# Patient Record
Sex: FEMALE | Race: WHITE | Employment: FULL TIME | URBAN - METROPOLITAN AREA
[De-identification: names, ages, dates, MRNs, and addresses within clinical notes are randomized per-mention and may not be internally consistent; named-entity substitution may affect disease eponyms.]

---

## 2017-09-12 ENCOUNTER — OFFICE VISIT (OUTPATIENT)
Dept: ORTHOPEDIC SURGERY | Age: 52
End: 2017-09-12

## 2017-09-12 VITALS
HEIGHT: 62 IN | DIASTOLIC BLOOD PRESSURE: 78 MMHG | SYSTOLIC BLOOD PRESSURE: 124 MMHG | WEIGHT: 115 LBS | BODY MASS INDEX: 21.16 KG/M2 | HEART RATE: 75 BPM

## 2017-09-12 DIAGNOSIS — S52.291A OTHER CLOSED FRACTURE OF SHAFT OF RIGHT ULNA, INITIAL ENCOUNTER: Primary | ICD-10-CM

## 2017-09-12 DIAGNOSIS — M79.631 RIGHT FOREARM PAIN: ICD-10-CM

## 2017-09-12 PROCEDURE — 99203 OFFICE O/P NEW LOW 30 MIN: CPT | Performed by: ORTHOPAEDIC SURGERY

## 2017-09-12 PROCEDURE — L3984 UPPER EXT FX ORTHOSIS WRIST: HCPCS | Performed by: ORTHOPAEDIC SURGERY

## 2017-09-12 RX ORDER — COVID-19 ANTIGEN TEST
KIT MISCELLANEOUS
COMMUNITY

## 2017-09-12 RX ORDER — ACETAMINOPHEN, ASPIRIN AND CAFFEINE 250; 250; 65 MG/1; MG/1; MG/1
TABLET, FILM COATED ORAL
COMMUNITY

## 2017-09-12 RX ORDER — EPINEPHRINE 0.3 MG/.3ML
INJECTION SUBCUTANEOUS
COMMUNITY
Start: 2016-03-04

## 2017-09-13 ENCOUNTER — TELEPHONE (OUTPATIENT)
Dept: ORTHOPEDIC SURGERY | Age: 52
End: 2017-09-13

## 2017-10-03 ENCOUNTER — OFFICE VISIT (OUTPATIENT)
Dept: ORTHOPEDIC SURGERY | Age: 52
End: 2017-10-03

## 2017-10-03 VITALS
HEART RATE: 71 BPM | WEIGHT: 115.08 LBS | HEIGHT: 62 IN | BODY MASS INDEX: 21.18 KG/M2 | DIASTOLIC BLOOD PRESSURE: 81 MMHG | SYSTOLIC BLOOD PRESSURE: 128 MMHG

## 2017-10-03 DIAGNOSIS — S52.291A OTHER CLOSED FRACTURE OF SHAFT OF RIGHT ULNA, INITIAL ENCOUNTER: Primary | ICD-10-CM

## 2017-10-03 DIAGNOSIS — M79.631 RIGHT FOREARM PAIN: ICD-10-CM

## 2017-10-03 PROCEDURE — 99212 OFFICE O/P EST SF 10 MIN: CPT | Performed by: ORTHOPAEDIC SURGERY

## 2017-10-03 NOTE — MR AVS SNAPSHOT
XR RADIUS ULNA RIGHT (2 VIEWS)          Result Summary for XR RADIUS ULNA RIGHT (2 VIEWS)      Result Information     Status          Final result (Exam End: 10/3/2017  4:27 PM)           10/3/2017  4:27 PM      Narrative & Impression           Radiology exam is complete. No Radiologist dictation. Please follow up with ordering provider. Additional Information        Basic Information     Date Of Birth Sex Race Ethnicity Preferred Language    1965 Female White / English      Problem List as of 10/3/2017  Date Reviewed: 10/3/2017          None      Preventive Care        Date Due    HIV screening is recommended for all people regardless of risk factors  aged 15-65 years at least once (lifetime) who have never been HIV tested. 3/26/1982    Tetanus Combination Vaccine (1 - Tdap) 3/26/1986    Pap Smear 3/26/1988    Cholesterol Screening 3/26/2007    Mammograms are recommended every 2 years for low/average risk patients aged 48 - 69, and every year for high risk patients per updated national guidelines. However these guidelines can be individualized by your provider. 3/26/2017    Colonoscopy 3/26/2017    Yearly Flu Vaccine (1) 9/1/2017            MyChart Signup           Our records indicate that you have declined MyChart signup.

## 2017-10-11 NOTE — PROGRESS NOTES
History of Present Illness:  Hardik Paris Returns for follow-up of her right forearm. I saw her on  September 12 4 weeks after a fall off a horse and diagnosed a minimally displaced nightstick fracture. We placed her arm in an Exos splint. She has found this comfortable and has been wearing it most of the time. She has not been back on her horse. She is able to perform activities of daily living without difficulty. Medical History:  Patient's medications, allergies, past medical, surgical, social and family histories were reviewed and updated as appropriate. Pertinent items are noted in HPI  Review of systems reviewed from Patient History Form dated on September 12, 2017 and available in the patient's chart under the Media tab. Vital Signs:  Vitals:    10/03/17 1625   BP: 128/81   Pulse: 71     Constitutional: She is in no apparent distress, and appears younger than her stated age. Right forearm Examination:    Inspection:  Minimal swelling Over the subcutaneous border of the ulna, but no deformity. Palpation:  Slight tenderness at the fracture site. No tenderness at the distal radioulnar joint or elbow. Active Range of Motion: Good wrist flexion and extension. Good forearm rotation with slight pain. Full elbow extension and flexion 0-150°. Passive Range of Motion:  Deferred. Strength:  Deferred. Special Tests:  Distal neurovascular exam is intact. Radiology:     Plain radiographs of the Right forearm comprising 2 views: AP and lateral were obtained and reviewed in the office: The showing nightstick fracture in good alignment. No significant callus formation. Impression: Delayed healing of right ulnar nightstick fracture. Assessment :  My impression Is that eWn Strickland is doing well clinically better fracture is not healing. She will need to wear the splint longer. There is a slight chance she is heading towards a nonunion.   We discussed use of a bone stimulator but it is too early to initiate this. Impression:  Encounter Diagnoses   Name Primary?  Right forearm pain     Other closed fracture of shaft of right ulna, initial encounter Yes       Office Procedures:  Orders Placed This Encounter   Procedures    XR RADIUS ULNA RIGHT (2 VIEWS)     Order Specific Question:   Reason for exam:     Answer:   exam       Treatment Plan: We will have her continue wearing the Exos splint and follow-up in 4 weeks. She is in agreement with this plan, and all questions were answered today. We will obtain x-rays of her Right forearm at her next visit. Sara Garcia MD, PhD  10/3/2017

## 2017-10-31 ENCOUNTER — OFFICE VISIT (OUTPATIENT)
Dept: ORTHOPEDIC SURGERY | Age: 52
End: 2017-10-31

## 2017-10-31 VITALS
DIASTOLIC BLOOD PRESSURE: 89 MMHG | HEART RATE: 91 BPM | WEIGHT: 115.08 LBS | SYSTOLIC BLOOD PRESSURE: 135 MMHG | HEIGHT: 62 IN | BODY MASS INDEX: 21.18 KG/M2

## 2017-10-31 DIAGNOSIS — S52.291A OTHER CLOSED FRACTURE OF SHAFT OF RIGHT ULNA, INITIAL ENCOUNTER: Primary | ICD-10-CM

## 2017-10-31 PROCEDURE — 99213 OFFICE O/P EST LOW 20 MIN: CPT | Performed by: ORTHOPAEDIC SURGERY

## 2017-10-31 RX ORDER — MULTIVIT WITH MINERALS/LUTEIN
1000 TABLET ORAL DAILY
COMMUNITY

## 2017-10-31 RX ORDER — CHLORAL HYDRATE 500 MG
3000 CAPSULE ORAL 3 TIMES DAILY
COMMUNITY

## 2017-10-31 RX ORDER — MULTIVIT WITH MINERALS/LUTEIN
250 TABLET ORAL DAILY
COMMUNITY

## 2017-10-31 NOTE — PROGRESS NOTES
He History of Present Illness:  Alphonso Hartley returns for follow-up of her right forearm. She has a nightstick fracture at the junction of the middle and distal one third of the ulna, sustained after a fall from her horse. This happened about 3 months ago and she did not present for treatment until a month later. She has been using an Exos splint. However she is still having pain. She has tenderness and slight swelling at the fracture site. She denies any reinjury. 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 She rates her pain levels at 2 out of 10. Since her last visit she has done some research about nonunion and has been decreasing her caffeine and alcohol intake and working on methods to reduce stress. Medical History:  Patient's medications, allergies, past medical, surgical, social and family histories were reviewed and updated as appropriate. Pertinent items are noted in HPI  Review of systems reviewed from Patient History Form dated on September 12, 2017 and available in the patient's chart under the Media tab. Vital Signs:  Vitals:    10/31/17 1043   BP: 135/89   Pulse: 91     Constitutional: She is in no apparent distress. She appears younger than her stated age of 50. Elbow Examination:    Inspection:  The right forearm is benign appearing except for some swelling at the fracture site. No obvious deformity. Palpation:  Tenderness at the fracture site. No crepitus. Active Range of Motion: Active supination is to 60° and pronation to 70° with some end range discomfort. Passive Range of Motion:  Full passive forearm rotation as well as wrist flexion and extension. She does have pain at the end range. Strength:  Deferred. Special Tests:  Neurovascularly intact distally and over the fracture site. Radiology:     Plain radiographs of the right forearm comprising 2 views: AP and lateral were  obtained and reviewed in the office:  These show persistent minimally displaced ulnar shaft fracture. The comminution is minimal.  The fracture ends are opposed wonderfully but there is no bridging callus. 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 Assessment :  My concern is that at least that is heading towards a nonunion of the right ulna. It is certainly a delayed union given that 3 months of collapse. I don't think it's going to get better with time. We need to consider bone stimulator at this time. Impression:  Encounter Diagnosis   Name Primary?  Other closed fracture of shaft of right ulna, initial encounter Yes       Office Procedures:  Orders Placed This Encounter   Procedures    XR RADIUS ULNA RIGHT (2 VIEWS)     Order Specific Question:   Reason for exam:     Answer:   exam    VT US,BONE,STIMULATION       Treatment Plan: We will set her up for use of a bone stimulator. We'll try this for at least   a coupleof  months. If she fails that then we perform an  open reduction and internal fixation. Either way we still have ample time to get her ready back to horseback riding by the spring. She can wear the Exos splint as needed when her arm is at risk. Sara Ignacio MD, PhD  10/31/2017

## 2018-05-15 ENCOUNTER — OFFICE VISIT (OUTPATIENT)
Dept: ORTHOPEDIC SURGERY | Age: 53
End: 2018-05-15

## 2018-05-15 VITALS
HEART RATE: 75 BPM | BODY MASS INDEX: 21.18 KG/M2 | WEIGHT: 115.08 LBS | DIASTOLIC BLOOD PRESSURE: 82 MMHG | HEIGHT: 62 IN | SYSTOLIC BLOOD PRESSURE: 94 MMHG

## 2018-05-15 DIAGNOSIS — S52.91XD CLOSED FRACTURE OF RIGHT FOREARM WITH ROUTINE HEALING, SUBSEQUENT ENCOUNTER: Primary | ICD-10-CM

## 2018-05-15 PROCEDURE — 99213 OFFICE O/P EST LOW 20 MIN: CPT | Performed by: ORTHOPAEDIC SURGERY

## 2018-05-15 PROCEDURE — L3984 UPPER EXT FX ORTHOSIS WRIST: HCPCS | Performed by: ORTHOPAEDIC SURGERY

## 2018-05-16 ENCOUNTER — TELEPHONE (OUTPATIENT)
Dept: ORTHOPEDIC SURGERY | Age: 53
End: 2018-05-16

## 2018-06-12 ENCOUNTER — OFFICE VISIT (OUTPATIENT)
Dept: ORTHOPEDIC SURGERY | Age: 53
End: 2018-06-12

## 2018-06-12 VITALS — BODY MASS INDEX: 21.18 KG/M2 | HEIGHT: 62 IN | WEIGHT: 115.08 LBS

## 2018-06-12 DIAGNOSIS — S52.91XD CLOSED FRACTURE OF RIGHT FOREARM WITH ROUTINE HEALING, SUBSEQUENT ENCOUNTER: Primary | ICD-10-CM

## 2018-06-12 PROCEDURE — 99213 OFFICE O/P EST LOW 20 MIN: CPT | Performed by: ORTHOPAEDIC SURGERY

## 2018-06-12 RX ORDER — MELOXICAM 15 MG/1
15 TABLET ORAL DAILY
Qty: 30 TABLET | Refills: 3 | Status: SHIPPED | OUTPATIENT
Start: 2018-06-12

## 2018-06-19 DIAGNOSIS — S52.91XD CLOSED FRACTURE OF RIGHT FOREARM WITH ROUTINE HEALING, SUBSEQUENT ENCOUNTER: Primary | ICD-10-CM

## 2018-06-20 ENCOUNTER — EVALUATION (OUTPATIENT)
Dept: PHYSICAL THERAPY | Age: 53
End: 2018-06-20

## 2018-06-20 DIAGNOSIS — S52.91XD CLOSED FRACTURE OF RIGHT FOREARM WITH ROUTINE HEALING: Primary | ICD-10-CM

## 2018-06-20 DIAGNOSIS — M79.631 PAIN IN RIGHT FOREARM: ICD-10-CM

## 2018-06-28 ENCOUNTER — EVALUATION (OUTPATIENT)
Dept: PHYSICAL THERAPY | Age: 53
End: 2018-06-28

## 2018-06-28 DIAGNOSIS — S52.91XG CLOSED FRACTURE OF RIGHT FOREARM WITH DELAYED HEALING, SUBSEQUENT ENCOUNTER: Primary | ICD-10-CM

## 2018-06-28 DIAGNOSIS — M79.631 PAIN IN RIGHT FOREARM: ICD-10-CM

## 2018-06-28 PROCEDURE — 97161 PT EVAL LOW COMPLEX 20 MIN: CPT | Performed by: PHYSICAL THERAPIST

## 2018-06-28 PROCEDURE — 97530 THERAPEUTIC ACTIVITIES: CPT | Performed by: PHYSICAL THERAPIST

## 2018-06-28 PROCEDURE — 97110 THERAPEUTIC EXERCISES: CPT | Performed by: PHYSICAL THERAPIST

## 2018-07-11 ENCOUNTER — TREATMENT (OUTPATIENT)
Dept: PHYSICAL THERAPY | Age: 53
End: 2018-07-11

## 2018-07-11 DIAGNOSIS — S52.91XG CLOSED FRACTURE OF RIGHT FOREARM WITH DELAYED HEALING, SUBSEQUENT ENCOUNTER: Primary | ICD-10-CM

## 2018-07-11 DIAGNOSIS — M79.631 PAIN IN RIGHT FOREARM: ICD-10-CM

## 2018-07-11 DIAGNOSIS — S52.91XD CLOSED FRACTURE OF RIGHT FOREARM WITH ROUTINE HEALING: ICD-10-CM

## 2018-07-11 PROCEDURE — 97110 THERAPEUTIC EXERCISES: CPT | Performed by: PHYSICAL THERAPIST

## 2018-07-11 PROCEDURE — 97530 THERAPEUTIC ACTIVITIES: CPT | Performed by: PHYSICAL THERAPIST

## 2019-03-11 ENCOUNTER — TELEPHONE (OUTPATIENT)
Dept: ORTHOPEDIC SURGERY | Age: 54
End: 2019-03-11

## 2019-03-25 ENCOUNTER — OFFICE VISIT (OUTPATIENT)
Dept: ORTHOPEDIC SURGERY | Age: 54
End: 2019-03-25
Payer: COMMERCIAL

## 2019-03-25 VITALS
SYSTOLIC BLOOD PRESSURE: 114 MMHG | WEIGHT: 130 LBS | HEART RATE: 84 BPM | DIASTOLIC BLOOD PRESSURE: 72 MMHG | HEIGHT: 62 IN | BODY MASS INDEX: 23.92 KG/M2

## 2019-03-25 DIAGNOSIS — M79.661 PAIN OF RIGHT LOWER LEG: Primary | ICD-10-CM

## 2019-03-25 PROBLEM — M51.9 LUMBAR DISC DISORDER: Status: ACTIVE | Noted: 2019-03-25

## 2019-03-25 PROBLEM — S62.614A CLOSED DISPLACED FRACTURE OF PROXIMAL PHALANX OF RIGHT RING FINGER: Status: ACTIVE | Noted: 2018-06-14

## 2019-03-25 PROBLEM — M53.3 SACRAL DISORDER: Status: ACTIVE | Noted: 2019-03-25

## 2019-03-25 PROBLEM — M67.80 ADHESIONS, FLEXOR OR EXTENSOR TENDONS: Status: ACTIVE | Noted: 2018-06-14

## 2019-03-25 PROBLEM — Z87.01 HISTORY OF PNEUMONIA: Status: ACTIVE | Noted: 2019-03-25

## 2019-03-25 PROBLEM — J30.2 SEASONAL ALLERGIES: Status: ACTIVE | Noted: 2019-03-25

## 2019-03-25 PROBLEM — S80.11XA CONTUSION OF RIGHT LOWER LEG: Status: ACTIVE | Noted: 2018-09-21

## 2019-03-25 PROCEDURE — 99213 OFFICE O/P EST LOW 20 MIN: CPT | Performed by: ORTHOPAEDIC SURGERY

## 2019-04-01 ENCOUNTER — OFFICE VISIT (OUTPATIENT)
Dept: ORTHOPEDIC SURGERY | Age: 54
End: 2019-04-01
Payer: COMMERCIAL

## 2019-04-01 VITALS
DIASTOLIC BLOOD PRESSURE: 77 MMHG | SYSTOLIC BLOOD PRESSURE: 133 MMHG | WEIGHT: 140 LBS | BODY MASS INDEX: 25.76 KG/M2 | HEART RATE: 80 BPM | HEIGHT: 62 IN

## 2019-04-01 DIAGNOSIS — S84.11XA INJURY OF RIGHT PERONEAL NERVE, INITIAL ENCOUNTER: Primary | ICD-10-CM

## 2019-04-01 PROCEDURE — 99213 OFFICE O/P EST LOW 20 MIN: CPT | Performed by: ORTHOPAEDIC SURGERY

## 2019-04-01 NOTE — PROGRESS NOTES
Gets together: Not on file     Attends Hindu service: Not on file     Active member of club or organization: Not on file     Attends meetings of clubs or organizations: Not on file     Relationship status: Not on file    Intimate partner violence:     Fear of current or ex partner: Not on file     Emotionally abused: Not on file     Physically abused: Not on file     Forced sexual activity: Not on file   Other Topics Concern    Not on file   Social History Narrative    Not on file       Current Outpatient Medications   Medication Sig Dispense Refill    meloxicam (MOBIC) 15 MG tablet Take 1 tablet by mouth daily 30 tablet 3    vitamin E 1000 units capsule Take 1,000 Units by mouth daily      Omega-3 Fatty Acids (FISH OIL) 1000 MG CAPS Take 3,000 mg by mouth 3 times daily      Ascorbic Acid (VITAMIN C) 250 MG tablet Take 250 mg by mouth daily      EPINEPHrine (EPIPEN) 0.3 MG/0.3ML SOAJ injection Use as directed      aspirin-acetaminophen-caffeine (EXCEDRIN MIGRAINE) 250-250-65 MG per tablet Take by mouth      Multiple Vitamins-Minerals (MULTIVITAMIN & MINERAL PO) Take 1 tablet by mouth      Naproxen Sodium (ALEVE) 220 MG CAPS Take by mouth       No current facility-administered medications for this visit. Allergies   Allergen Reactions    Erythromycin      Able to take zpac    Penicillins     Wasp Venom Protein        Vital signs:  /77   Pulse 80   Ht 5' 2\" (1.575 m)   Wt 140 lb (63.5 kg)   BMI 25.61 kg/m²        Neuro: Alert & oriented x 3,  normal,  no focal deficits noted. Normal affect. Eyes: sclera clear  Ears: Normal external ear  Mouth:  No perioral lesions  Pulm: Respirations unlabored and regular  Pulse: Regular rate and rhythm   Skin: Warm, well perfused    Constitutional: In no apparent distress. Normal affect. Alert and oriented X3 and is cooperative. RIGHT Foot Exam:    Inspection: There is normal alignment. No swelling or ecchymosis is present.      Gait: Patient is able to weight-bear without pain. Range of motion: Patient can perform a toe rise without pain. There is full range of motion of the ankle, midfoot, hindfoot and forefoot. Stability: No instability is present. Strength: Normal strength is present. Palpation: There is no focal tenderness. Neurovascular: Skin is warm and well-perfused. Sensation normal.    LEFT Foot Comparison Exam:    Inspection: There is normal alignment. No swelling or ecchymosis is present. Gait: Patient is able to weight-bear without pain. Range of motion: Patient can perform a toe rise without pain. There is full range of motion of the ankle, midfoot, hindfoot and forefoot. Stability: No instability is present. Strength: Normal strength is present. Palpation: There is no focal tenderness. Neurovascular: Skin is warm and well-perfused. Sensation normal.    RIGHT Lower Extremity Exam:    There is normal alignment. Normal muscle tone and development. Full range of motion is present at the hip, knee and ankle. No instability is present. Palpable herniation over anterior compartment. No swelling or ecchymosis is present. The skin is warm and well-perfused. Sensation is intact to light touch. LEFT Lower Extremity Exam:    There is normal alignment. Normal muscle tone and development. Full range of motion is present at the hip, knee and ankle. No instability is present. There is no focal tenderness. No swelling or ecchymosis is present. The skin is warm and well-perfused. Sensation is intact to light touch. Radiology:     No new imaging was obtained during today's visit. Assessment :  Peroneal nerve injury, right leg. Impression:  Encounter Diagnosis   Name Primary?     Injury of right peroneal nerve, initial encounter Yes       Office Procedures:  No orders of the defined types were placed in this encounter. Plan: I am able to recreate her tingling in the foot and toes when tapping over the peroneal nerve. I think she has some scarring over that nerve. We have discussed that compression over the area can exacerbate her symptoms. Massage would also be helpful; this is something she is able to do. I would like to get her started with Voltaren gel to use over the are. She can follow up with me if her symptoms persist or worsen. Alexy Beatty is in agreement with this plan. All questions were answered to patient's satisfaction and was encouraged to call with any further questions. Elif Ureña ATC, am scribing for and in the presence of Dr. Norma Peña. 04/01/19 5:28 PM Dora Resendez ATC          I personally reviewed the patient's pain scale, review of systems, family history, social history, past medical history, allergies and medications. 13 point review of systems was collected and reviewed today. It is noncontributory. I personally performed the services described in this documentation and scribed by Dora Resendez ATC. Primitivo Pickett MD  Sports Medicine, Arthroscopic Knee and Shoulder Surgery    This dictation was performed with a verbal recognition program [facility]) and it was checked for errors. It is possible that there are still dictated errors within this office note. If so, please bring any errors to my attention for an addendum. All efforts were made to ensure that this office note is accurate.